# Patient Record
Sex: FEMALE | Race: WHITE | NOT HISPANIC OR LATINO | Employment: OTHER | URBAN - METROPOLITAN AREA
[De-identification: names, ages, dates, MRNs, and addresses within clinical notes are randomized per-mention and may not be internally consistent; named-entity substitution may affect disease eponyms.]

---

## 2024-08-13 ENCOUNTER — HOSPITAL ENCOUNTER (EMERGENCY)
Facility: HOSPITAL | Age: 24
Discharge: HOME | End: 2024-08-14
Attending: STUDENT IN AN ORGANIZED HEALTH CARE EDUCATION/TRAINING PROGRAM
Payer: COMMERCIAL

## 2024-08-13 ENCOUNTER — APPOINTMENT (OUTPATIENT)
Dept: RADIOLOGY | Facility: HOSPITAL | Age: 24
End: 2024-08-13
Payer: COMMERCIAL

## 2024-08-13 DIAGNOSIS — O20.0 THREATENED MISCARRIAGE (HHS-HCC): Primary | ICD-10-CM

## 2024-08-13 DIAGNOSIS — E87.1 HYPONATREMIA: ICD-10-CM

## 2024-08-13 LAB
ABO GROUP (TYPE) IN BLOOD: NORMAL
ALBUMIN SERPL BCP-MCNC: 4.3 G/DL (ref 3.4–5)
ALP SERPL-CCNC: 56 U/L (ref 33–110)
ALT SERPL W P-5'-P-CCNC: 13 U/L (ref 7–45)
ANION GAP SERPL CALC-SCNC: 11 MMOL/L (ref 10–20)
ANTIBODY SCREEN: NORMAL
APPEARANCE UR: CLEAR
AST SERPL W P-5'-P-CCNC: 18 U/L (ref 9–39)
BASOPHILS # BLD AUTO: 0.04 X10*3/UL (ref 0–0.1)
BASOPHILS NFR BLD AUTO: 0.4 %
BILIRUB SERPL-MCNC: 0.2 MG/DL (ref 0–1.2)
BILIRUB UR STRIP.AUTO-MCNC: NEGATIVE MG/DL
BUN SERPL-MCNC: 13 MG/DL (ref 6–23)
CALCIUM SERPL-MCNC: 8.7 MG/DL (ref 8.6–10.3)
CHLORIDE SERPL-SCNC: 100 MMOL/L (ref 98–107)
CO2 SERPL-SCNC: 25 MMOL/L (ref 21–32)
COLOR UR: NORMAL
CREAT SERPL-MCNC: 0.61 MG/DL (ref 0.5–1.05)
EGFRCR SERPLBLD CKD-EPI 2021: >90 ML/MIN/1.73M*2
EOSINOPHIL # BLD AUTO: 0.27 X10*3/UL (ref 0–0.7)
EOSINOPHIL NFR BLD AUTO: 2.4 %
ERYTHROCYTE [DISTWIDTH] IN BLOOD BY AUTOMATED COUNT: 11.9 % (ref 11.5–14.5)
GLUCOSE SERPL-MCNC: 80 MG/DL (ref 74–99)
GLUCOSE UR STRIP.AUTO-MCNC: NORMAL MG/DL
HCT VFR BLD AUTO: 36.4 % (ref 36–46)
HGB BLD-MCNC: 12.4 G/DL (ref 12–16)
IMM GRANULOCYTES # BLD AUTO: 0.05 X10*3/UL (ref 0–0.7)
IMM GRANULOCYTES NFR BLD AUTO: 0.5 % (ref 0–0.9)
KETONES UR STRIP.AUTO-MCNC: NEGATIVE MG/DL
LEUKOCYTE ESTERASE UR QL STRIP.AUTO: NEGATIVE
LYMPHOCYTES # BLD AUTO: 2.96 X10*3/UL (ref 1.2–4.8)
LYMPHOCYTES NFR BLD AUTO: 26.7 %
MCH RBC QN AUTO: 29 PG (ref 26–34)
MCHC RBC AUTO-ENTMCNC: 34.1 G/DL (ref 32–36)
MCV RBC AUTO: 85 FL (ref 80–100)
MONOCYTES # BLD AUTO: 0.65 X10*3/UL (ref 0.1–1)
MONOCYTES NFR BLD AUTO: 5.9 %
NEUTROPHILS # BLD AUTO: 7.12 X10*3/UL (ref 1.2–7.7)
NEUTROPHILS NFR BLD AUTO: 64.1 %
NITRITE UR QL STRIP.AUTO: NEGATIVE
NRBC BLD-RTO: 0 /100 WBCS (ref 0–0)
PH UR STRIP.AUTO: 6 [PH]
PLATELET # BLD AUTO: 283 X10*3/UL (ref 150–450)
POTASSIUM SERPL-SCNC: 3.9 MMOL/L (ref 3.5–5.3)
PROT SERPL-MCNC: 6.9 G/DL (ref 6.4–8.2)
PROT UR STRIP.AUTO-MCNC: NEGATIVE MG/DL
RBC # BLD AUTO: 4.27 X10*6/UL (ref 4–5.2)
RBC # UR STRIP.AUTO: NEGATIVE /UL
RH FACTOR (ANTIGEN D): NORMAL
SODIUM SERPL-SCNC: 132 MMOL/L (ref 136–145)
SP GR UR STRIP.AUTO: 1.02
UROBILINOGEN UR STRIP.AUTO-MCNC: NORMAL MG/DL
WBC # BLD AUTO: 11.1 X10*3/UL (ref 4.4–11.3)

## 2024-08-13 PROCEDURE — 76801 OB US < 14 WKS SINGLE FETUS: CPT | Performed by: RADIOLOGY

## 2024-08-13 PROCEDURE — 84702 CHORIONIC GONADOTROPIN TEST: CPT | Performed by: STUDENT IN AN ORGANIZED HEALTH CARE EDUCATION/TRAINING PROGRAM

## 2024-08-13 PROCEDURE — 86901 BLOOD TYPING SEROLOGIC RH(D): CPT | Performed by: STUDENT IN AN ORGANIZED HEALTH CARE EDUCATION/TRAINING PROGRAM

## 2024-08-13 PROCEDURE — 80053 COMPREHEN METABOLIC PANEL: CPT | Performed by: STUDENT IN AN ORGANIZED HEALTH CARE EDUCATION/TRAINING PROGRAM

## 2024-08-13 PROCEDURE — 99284 EMERGENCY DEPT VISIT MOD MDM: CPT | Mod: 25

## 2024-08-13 PROCEDURE — 81003 URINALYSIS AUTO W/O SCOPE: CPT | Performed by: STUDENT IN AN ORGANIZED HEALTH CARE EDUCATION/TRAINING PROGRAM

## 2024-08-13 PROCEDURE — 85025 COMPLETE CBC W/AUTO DIFF WBC: CPT | Performed by: STUDENT IN AN ORGANIZED HEALTH CARE EDUCATION/TRAINING PROGRAM

## 2024-08-13 PROCEDURE — 76801 OB US < 14 WKS SINGLE FETUS: CPT

## 2024-08-13 PROCEDURE — 36415 COLL VENOUS BLD VENIPUNCTURE: CPT | Performed by: STUDENT IN AN ORGANIZED HEALTH CARE EDUCATION/TRAINING PROGRAM

## 2024-08-13 ASSESSMENT — COLUMBIA-SUICIDE SEVERITY RATING SCALE - C-SSRS
6. HAVE YOU EVER DONE ANYTHING, STARTED TO DO ANYTHING, OR PREPARED TO DO ANYTHING TO END YOUR LIFE?: NO
1. IN THE PAST MONTH, HAVE YOU WISHED YOU WERE DEAD OR WISHED YOU COULD GO TO SLEEP AND NOT WAKE UP?: NO
2. HAVE YOU ACTUALLY HAD ANY THOUGHTS OF KILLING YOURSELF?: NO

## 2024-08-14 VITALS
HEART RATE: 82 BPM | OXYGEN SATURATION: 98 % | BODY MASS INDEX: 23.92 KG/M2 | HEIGHT: 62 IN | WEIGHT: 130 LBS | RESPIRATION RATE: 16 BRPM | SYSTOLIC BLOOD PRESSURE: 120 MMHG | TEMPERATURE: 98.1 F | DIASTOLIC BLOOD PRESSURE: 74 MMHG

## 2024-08-14 LAB — B-HCG SERPL-ACNC: ABNORMAL MIU/ML

## 2024-08-14 NOTE — ED TRIAGE NOTES
Pt reports that on 24, while Pt was about 7 weeks pregnant, Pt had an episode of heavy vaginal bleeding accompanied with abdominal cramping. Pt reports since the episode of vaginal bleeding 11 days ago, Pt has had brown discharge and intermittent cramping. Pt is currently  P:1. Pt is concerned that the episode of heavy vaginal bleeding may have been a miscarriage. Pt has no previous hx of miscarriages.

## 2024-08-14 NOTE — ED PROVIDER NOTES
History/Exam limitations: none  HPI was provided by patient    HPI:    Chief Complaint   Patient presents with    Threatened Miscarriage     Pt reports that on 24, while Pt was about 7 weeks pregnant, Pt had an episode of heavy vaginal bleeding accompanied with abdominal cramping. Pt reports since the episode of vaginal bleeding 11 days ago, Pt has had brown discharge and intermittent cramping. Pt is currently  P:1. Pt is concerned that the episode of heavy vaginal bleeding may have been a miscarriage. Pt has no previous hx of miscarriages.         Ann Fierro is a 24 y.o. female presents with chief complaint of threatened miscarriage.  Patient feels she is about 8 weeks pregnant.  HPI above.  States she had the heavy bleeding about a week ago different from what she stated 11 days ago above and then some discharge since and then tonight cramping.  Has not had a intrauterine ultrasound performed yet.  She plans to follow-up with OB in [place].  Cramping no longer present here.  Denies any bleeding here or discharge presently.       ROS: All other review of systems are negative except as noted above and HPI or ROS.   CONSTITUTIONAL: fever, chills  ENT: sore throat, congestion, rhinorrhea  CARDIOVASCULAR: chest pain, palpitations, swelling  RESPIRATORY: cough, wheeze, shortness of breath  GI: nausea, vomiting, diarrhea, abdominal pain,  black or tarry stools, constipation  GENITOURINARY: dysuria, hematuria, frequency, vaginal bleeding, vaginal discharge, suprapubic/pelvic pain  MUSCULOSKELETAL: deformity, neck pain  SKIN: rash, lesion  NEUROLOGIC: headache, numbness, focal weakness  NOTES: All systems reviewed, negative except as described above       Physical Exam:  GENERAL: Alert, oriented , cooperative,  in no acute distress.    HEAD: normocephalic, atraumatic    SKIN:  dry skin, no lesions.    EYES: PERRL, EOMs intact,  Conjunctiva pink with no erythema or exudates. No scleral icterus.     ENT: No  external deformities. Nares patent, mucus membranes moist.  Pharynx clear, uvula midline.     NECK: Supple, without meningismus. Trachea at midline. No lymphadenopathy.    PULMONARY: Clear bilaterally. No crackles, rhonchi, wheezing.  No respiratory distress.  No work of breathing.    CARDIAC: Regular rate and regular rhythm.  Pulses 2+ in radials and dorsal pedal pulses bilaterally.  No murmur, rub, gallop.  No edema.    ABDOMEN: Soft, nontender, active bowel sounds.  No palpable organomegaly.  No rebound or guarding.  No CVA tenderness.  No pulsatile masses.  Negative Ace sign, negative McBurney point    : Patient declined stating she will follow-up with her OB by calling tomorrow and have done wet prep and GC chlamydia and flu exam.  Understanding limitations to examination.    MUSCULOSKELETAL: Full range of motion throughout, no deformity.     NEUROLOGICAL:  CN II through XII are grossly intact, no focal neuro deficits.  Strength 5 out of 5 throughout bilateral upper and lower extremities neurovascular intact in bilateral upper and lower extremities    PSYCHIATRIC: Appropriate mood and affect. Calm.       MDM/ED COURSE:      The patient presented for evaluation of vaginal bleeding.  Differential included but not limited to pregnancy, chorionic hemorrhage, ectopic, fibroids, abnormal uterine bleeding.   Lab work and imaging were performed      The patient has stable vs and will be discharge home.   The patient and caregiver are in agreement with the plan and given instructions to follow up with OB/GYN        Imaging studies if performed were independently reviewed and interpreted by myself and confirmed by radiologist.        External Records Reviewed: I reviewed recent and relevant outside records      I discussed the differential, results and plan with the patient and/or family/friend/caregiver if present.       I emphasized the importance of follow-up with the physician I referred them to in the timeframe  recommended.  I explained reasons for the patient to return to the Emergency Department. Additional verbal discharge instructions were also given and discussed with the patient to supplement those generated by the EMR. We also discussed medications that were prescribed (if any) including common side effects and interactions. The patient was advised to abstain from driving, operating heavy machinery or making significant decisions while taking medications such as opiates and muscle relaxers that may impair this. All questions were addressed.  They understand return precautions and discharge instructions. The patient and/or family/friend/caregiver expressed understanding.     Note: This note was dictated by speech recognition. Minor errors in transcription may be present.    ED Course as of 08/14/24 0100   Tue Aug 13, 2024   2335 US OB LESS THAN 14 WEEKS EARLY  Single live intrauterine pregnancy with gestational age of  9 weeks 6  days based on crown-rump length of 3.0 cm. Fetal cardiac activity is  present with heart rate of 154 beats per minute. Routine fetal  anatomic survey ultrasound is recommended between 18 and 20 weeks  gestational age.   [WL]   Wed Aug 14, 2024   0050 Sodium 132.  Will give a bolus of normal saline. [WL]      ED Course User Index  [WL] Kenji Sheridan DO         Diagnoses as of 08/14/24 0100   Threatened miscarriage ([facility])   Hyponatremia         No past medical history on file.   Social History     Socioeconomic History    Marital status:      No current outpatient medications  No Known Allergies          ED Triage Vitals [08/13/24 2115]   Temperature Heart Rate Respirations BP   36.7 °C (98.1 °F) 80 16 116/78      Pulse Ox Temp Source Heart Rate Source Patient Position   98 % Temporal -- Sitting      BP Location FiO2 (%)     Left arm --               Labs and Imaging  US OB LESS THAN 14 WEEKS EARLY   Final Result   Single live intrauterine pregnancy with gestational age of  9 weeks  6   days based on crown-rump length of 3.0 cm. Fetal cardiac activity is   present with heart rate of 154 beats per minute. Routine fetal   anatomic survey ultrasound is recommended between 18 and 20 weeks   gestational age.             MACRO:   None        Signed by: Kimberly Pollack 8/13/2024 11:19 PM   Dictation workstation:   [id number]        Labs Reviewed   COMPREHENSIVE METABOLIC PANEL - Abnormal       Result Value    Glucose 80      Sodium 132 (*)     Potassium 3.9      Chloride 100      Bicarbonate 25      Anion Gap 11      Urea Nitrogen 13      Creatinine 0.61      eGFR >90      Calcium 8.7      Albumin 4.3      Alkaline Phosphatase 56      Total Protein 6.9      AST 18      Bilirubin, Total 0.2      ALT 13     HUMAN CHORIONIC GONADOTROPIN, SERUM QUANTITATIVE - Abnormal    HCG, Beta-Quantitative 219,262 (*)     Narrative:      Total HCG measurement is performed using the [employer]   Immunoassay which detects intact HCG and free beta HCG subunit.    This test is not indicated for use as a tumor marker.   HCG testing is performed using a different test methodology at [facility] than other [facility]. Direct result comparison   should only be made within the same method.       URINALYSIS WITH REFLEX MICROSCOPIC - Normal    Color, Urine Light-Yellow      Appearance, Urine Clear      Specific Gravity, Urine 1.018      pH, Urine 6.0      Protein, Urine NEGATIVE      Glucose, Urine Normal      Blood, Urine NEGATIVE      Ketones, Urine NEGATIVE      Bilirubin, Urine NEGATIVE      Urobilinogen, Urine Normal      Nitrite, Urine NEGATIVE      Leukocyte Esterase, Urine NEGATIVE     CBC WITH AUTO DIFFERENTIAL    WBC 11.1      nRBC 0.0      RBC 4.27      Hemoglobin 12.4      Hematocrit 36.4      MCV 85      MCH 29.0      MCHC 34.1      RDW 11.9      Platelets 283      Neutrophils % 64.1      Immature Granulocytes %, Automated 0.5      Lymphocytes % 26.7      Monocytes % 5.9       Eosinophils % 2.4      Basophils % 0.4      Neutrophils Absolute 7.12      Immature Granulocytes Absolute, Automated 0.05      Lymphocytes Absolute 2.96      Monocytes Absolute 0.65      Eosinophils Absolute 0.27      Basophils Absolute 0.04     TYPE AND SCREEN    ABO TYPE A      Rh TYPE POS      ANTIBODY SCREEN NEG             Procedure  Procedures                  Kenji Sheridan DO  08/14/24 0100